# Patient Record
(demographics unavailable — no encounter records)

---

## 2025-02-05 NOTE — HISTORY OF PRESENT ILLNESS
[FreeTextEntry1] : Mr. ESPINAL presents for the evaluation of chest pain He notes he has been having chest pain for a few months. It is not exertional. In fact he has started jogging the last few weeks and this does not make the symptoms worse He also notes his BP has been very high but has not relayed that to this office. He did drastically change his diet the last few days and BP is better. He feels the chest symptom is better with better BP.